# Patient Record
Sex: MALE | Race: WHITE | URBAN - METROPOLITAN AREA
[De-identification: names, ages, dates, MRNs, and addresses within clinical notes are randomized per-mention and may not be internally consistent; named-entity substitution may affect disease eponyms.]

---

## 2021-08-12 ENCOUNTER — HOSPITAL ENCOUNTER (EMERGENCY)
Age: 61
Discharge: HOME | End: 2021-08-12
Payer: COMMERCIAL

## 2022-04-12 ENCOUNTER — HOSPITAL ENCOUNTER (OUTPATIENT)
Dept: GENERAL RADIOLOGY | Age: 62
Discharge: HOME | End: 2022-04-12
Payer: COMMERCIAL

## 2022-04-12 ENCOUNTER — HOSPITAL ENCOUNTER (OUTPATIENT)
Age: 62
Discharge: HOME | End: 2022-04-12
Payer: COMMERCIAL

## 2022-04-12 DIAGNOSIS — Z02.1 DRUG SCREENING, PRE-EMPLOYMENT: ICD-10-CM

## 2022-04-12 PROCEDURE — 71046 X-RAY EXAM CHEST 2 VIEWS: CPT

## 2024-09-21 ENCOUNTER — OFFICE VISIT (OUTPATIENT)
Dept: ORTHOPEDIC SURGERY | Age: 64
End: 2024-09-21

## 2024-09-21 VITALS — WEIGHT: 247 LBS | BODY MASS INDEX: 35.36 KG/M2 | HEIGHT: 70 IN

## 2024-09-21 DIAGNOSIS — M25.561 ACUTE PAIN OF RIGHT KNEE: ICD-10-CM

## 2024-09-21 DIAGNOSIS — M25.561 RIGHT KNEE PAIN, UNSPECIFIED CHRONICITY: Primary | ICD-10-CM

## 2024-09-23 ENCOUNTER — TELEPHONE (OUTPATIENT)
Dept: ORTHOPEDIC SURGERY | Age: 64
End: 2024-09-23

## 2024-09-26 ENCOUNTER — OFFICE VISIT (OUTPATIENT)
Dept: ORTHOPEDIC SURGERY | Age: 64
End: 2024-09-26

## 2024-09-26 VITALS — HEIGHT: 70 IN | BODY MASS INDEX: 35.36 KG/M2 | WEIGHT: 247 LBS

## 2024-09-26 DIAGNOSIS — M23.203 DEGENERATIVE TEAR OF MEDIAL MENISCUS OF RIGHT KNEE: ICD-10-CM

## 2024-09-26 DIAGNOSIS — S86.911A KNEE STRAIN, RIGHT, INITIAL ENCOUNTER: ICD-10-CM

## 2024-09-26 DIAGNOSIS — M25.561 ACUTE PAIN OF RIGHT KNEE: ICD-10-CM

## 2024-09-26 DIAGNOSIS — M17.11 PRIMARY OSTEOARTHRITIS OF RIGHT KNEE: ICD-10-CM

## 2024-09-26 RX ORDER — METHYLPREDNISOLONE 4 MG
TABLET, DOSE PACK ORAL
Qty: 1 KIT | Refills: 0 | Status: SHIPPED | OUTPATIENT
Start: 2024-09-26

## 2024-10-03 ENCOUNTER — HOSPITAL ENCOUNTER (OUTPATIENT)
Dept: PHYSICAL THERAPY | Age: 64
Setting detail: THERAPIES SERIES
Discharge: HOME | End: 2024-10-03
Payer: COMMERCIAL

## 2024-10-03 DIAGNOSIS — R52 PAIN: ICD-10-CM

## 2024-10-03 DIAGNOSIS — R53.1 WEAKNESS: ICD-10-CM

## 2024-10-03 DIAGNOSIS — R26.89 BALANCE DISORDER: ICD-10-CM

## 2024-10-03 DIAGNOSIS — R26.9 GAIT ABNORMALITY: Primary | ICD-10-CM

## 2024-10-03 PROCEDURE — 97530 THERAPEUTIC ACTIVITIES: CPT

## 2024-10-03 PROCEDURE — 97110 THERAPEUTIC EXERCISES: CPT

## 2024-10-03 PROCEDURE — 97161 PT EVAL LOW COMPLEX 20 MIN: CPT

## 2024-10-03 NOTE — PLAN OF CARE
[facility] - Outpatient Rehabilitation and Therapy [address], [place] [address] office: [phone] fax: [phone]     Physical Therapy Initial Evaluation Certification      Dear Tian James*,    We had the pleasure of evaluating the following patient for physical therapy services at [facility].  A summary of our findings can be found in the initial assessment below.  This includes our plan of care.  If you have any questions or concerns regarding these findings, please do not hesitate to contact me at the office phone number listed above.  Thank you for the referral.     Physician Signature:_______________________________Date:__________________  By signing above (or electronic signature), therapist’s plan is approved by physician       Physical Therapy: TREATMENT/PROGRESS NOTE   Patient: Luis Fernando Diaan (64 y.o. male)   Examination Date: 10/03/2024   :  1960 MRN: [id number]   Visit #: 1   Insurance Allowable Auth Needed   60/yr  0 used as of 10/3/24 []Yes    [x]No    Insurance: Payor: [facility] / Plan: BCBS - [place] PPO / Product Type: *No Product type* /   Insurance ID: [id number] - ([place] [employer])  Secondary Insurance (if applicable): OCCUPATIONAL HE[employer]*   Treatment Diagnosis:     ICD-10-CM    1. Gait abnormality  R26.9       2. Pain  R52       3. Weakness  R53.1       4. Balance disorder  R26.89          Medical Diagnosis:  Acute pain of right knee [M25.561]   Referring Physician: Tian James*  PCP: James Cooney MD     Plan of care signed (Y/N):     Date of Patient follow up with Physician: 10/10/24     Plan of Care Report: DIGNA pitts  POC update due: (10 visits /OR AUTH LIMITS, whichever is less)  2024                                             Medical History:  Relevant Medical History: none                                         Precautions/ Contra-indications:           Latex allergy:

## 2024-10-08 ENCOUNTER — HOSPITAL ENCOUNTER (OUTPATIENT)
Dept: PHYSICAL THERAPY | Age: 64
Setting detail: THERAPIES SERIES
Discharge: HOME | End: 2024-10-08
Payer: COMMERCIAL

## 2024-10-08 PROCEDURE — 97530 THERAPEUTIC ACTIVITIES: CPT

## 2024-10-08 PROCEDURE — 97110 THERAPEUTIC EXERCISES: CPT

## 2024-10-08 NOTE — FLOWSHEET NOTE
1/2 foam calf raise  2 10    Leg ext machine 30# 2 10    Leg curl machine 40# 2 10    Leg press       Lateral band walks YTB 20 ft 1                                       Manual Intervention (06563)  TIME                                        NMR re-education (07873) resistance Sets/time Reps CUES NEEDED   Tandem airex 1' ea  With 10# KB UE passes   1/2 foam  1' ea  Both sides   SLS                            Therapeutic Activity (92718)  Sets/time            Squat TRX 2 10    Step up 4\" 2 10    Step down                Modalities:    No modalities applied this session    Education/Home Exercise Program: Patient HEP program created electronically.  Refer to [employer] access code: [email or web address]  Access Code: [email or web address]  URL: [email or web address]  Date: 10/03/2024  Prepared by: Patricia Sanz    Exercises  - Prone Hip Extension  - 1 x daily - 7 x weekly - 3 sets - 10 reps  - Sidelying Hip Abduction  - 1 x daily - 7 x weekly - 3 sets - 10 reps  - Active Straight Leg Raise with Quad Set  - 1 x daily - 7 x weekly - 3 sets - 10 reps  - Sidelying Hip Adduction  - 1 x daily - 7 x weekly - 3 sets - 10 reps  - Wall Squat  - 1 x daily - 7 x weekly - 1 sets - 3-5 reps - 20\" hold  - Standing Terminal Knee Extension at Wall with Ball  - 1 x daily - 7 x weekly - 1 sets - 20 reps - 3\" hold    ASSESSMENT     Today's Assessment:   Exercises introduced per log. No reproduction of sx during the above activities, though patient did have medial knee soreness, discussed this was to be expected and to manage inc in pain. Pt appropriately fatigued at end of session. Will progress exercises as tolerated.       Medical Necessity Documentation:  I certify that this patient meets the below criteria necessary for medical necessity for care and/or justification of therapy services:  The patient has functional impairments and/or activity limitations and would benefit from continued outpatient therapy services to address the deficits outlined in the

## 2024-10-10 ENCOUNTER — OFFICE VISIT (OUTPATIENT)
Dept: ORTHOPEDIC SURGERY | Age: 64
End: 2024-10-10
Payer: COMMERCIAL

## 2024-10-10 VITALS — HEIGHT: 70 IN | WEIGHT: 246.91 LBS | BODY MASS INDEX: 35.35 KG/M2

## 2024-10-10 DIAGNOSIS — M17.11 PRIMARY OSTEOARTHRITIS OF RIGHT KNEE: Primary | ICD-10-CM

## 2024-10-10 DIAGNOSIS — S86.911D KNEE STRAIN, RIGHT, SUBSEQUENT ENCOUNTER: ICD-10-CM

## 2024-10-10 DIAGNOSIS — M23.203 DEGENERATIVE TEAR OF MEDIAL MENISCUS OF RIGHT KNEE: ICD-10-CM

## 2024-10-10 PROCEDURE — 99213 OFFICE O/P EST LOW 20 MIN: CPT | Performed by: INTERNAL MEDICINE

## 2024-10-10 PROCEDURE — L1833 KO ADJ JNT POS R SUP PRE OTS: HCPCS | Performed by: INTERNAL MEDICINE

## 2024-10-15 ENCOUNTER — APPOINTMENT (OUTPATIENT)
Dept: PHYSICAL THERAPY | Age: 64
End: 2024-10-15
Payer: COMMERCIAL

## 2024-10-15 NOTE — PROGRESS NOTES
worsening of the condition.           Other Outside Imaging and Testing Personally Reviewed:    No results found.           Assessment   Impression: .    Encounter Diagnoses   Name Primary?    Primary osteoarthritis of right knee Yes    Degenerative tear of medial meniscus of right knee     Knee strain, right, subsequent encounter               Plan:     PA MALIK right knee as a treatment option to consider pending his improvement over the next 10 to 14 days  continue course of PT and transition to IHEP  Activity modification -knee precautions and caution against overuse  Medical pain management: analgesic: Tylenol, NSAIDs contraindicated relative to CAD  DJ playmaker functional hinged knee brace     Orders:        Orders Placed This Encounter   Procedures    DJO Playmaker Brace     Patient was prescribed a Don Franny Playmaker.  The right knee will require stabilization / immobilization from this semi-rigid / rigid orthosis to improve their function.  The orthosis will assist in protecting the affected area, provide functional support and facilitate healing.    The patient was educated and fit by a healthcare professional with expert knowledge and specialization in brace application while under the direct supervision of the physician.  Verbal and written instructions for the use of and application of this item were provided.   They were instructed to contact the office immediately should the brace result in increased pain, decreased sensation, increased swelling or worsening of the condition.         Tian James MD.      Disclaimer:    \"This note was dictated with voice recognition software. Though review and correction are routine, we apologize for any errors.\"

## 2024-10-17 ENCOUNTER — APPOINTMENT (OUTPATIENT)
Dept: PHYSICAL THERAPY | Age: 64
End: 2024-10-17
Payer: COMMERCIAL

## 2024-10-22 ENCOUNTER — HOSPITAL ENCOUNTER (OUTPATIENT)
Dept: PHYSICAL THERAPY | Age: 64
Setting detail: THERAPIES SERIES
Discharge: HOME | End: 2024-10-22
Payer: COMMERCIAL

## 2024-10-22 PROCEDURE — 97110 THERAPEUTIC EXERCISES: CPT

## 2024-10-22 PROCEDURE — 97530 THERAPEUTIC ACTIVITIES: CPT

## 2024-10-22 NOTE — FLOWSHEET NOTE
[facility] with confirmed medial meniscus tear. Pt denies catching, locking, giving way. Hx of L knee scope 4x, no issues with this currently. Uses R knee sleeve. Step to pattern to go up/down stairs. 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    MRI on 9/25/24:  CONCLUSION:   1. Trizonal undersurface 3 cm flap tear of the medial meniscus, extending from posterior root-horn junction to midbody, with slight meniscal body extrusion.   2. 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   3. Focal grade 4 chondromalacia of the trochlear groove, with penetrating erosion and localized osteoedema of the midbody aspect.   4. Mild tendinopathy of the medial gastrocnemius tendon origin and distal semimembranosus tendon insertion, with adjacent partially dehisced Baker cyst.   5. No lateral meniscus tear. No cruciate ligament or lateral collateral complex injury.        Test used Initial score  10/3/24 10/22/2024   Pain Summary VAS 4/10    Functional questionnaire WOMAC 19/96  20% LOF    Other:              OBJECTIVE EXAMINATION     10/3/24  ROM/Strength: (Blank cells denote NT)   R 0-131  L 0-139      MMT R MMT L Notes       HIP  Flexion 5       Abduction 5       ER        IR        Extension 5     KNEE  Flexion 44.5 33.8      Extension 36.0 43.0      ANKLE  DF        PF        Inversion        Eversion        Palpation:   Patient reported tenderness with palpation  Location:R medial joint line    Posture:   genu valgum Bilateral - mild    Gait:    Pattern: antalgic pattern  Assistive Device Used: Crutch(es) on left     Exercises/Interventions     Therapeutic Ex (47435)  resistance Sets/time Reps

## 2024-10-24 ENCOUNTER — HOSPITAL ENCOUNTER (OUTPATIENT)
Dept: PHYSICAL THERAPY | Age: 64
Setting detail: THERAPIES SERIES
Discharge: HOME | End: 2024-10-24
Payer: COMMERCIAL

## 2024-10-24 PROCEDURE — 97110 THERAPEUTIC EXERCISES: CPT

## 2024-10-24 PROCEDURE — 97530 THERAPEUTIC ACTIVITIES: CPT

## 2024-10-24 NOTE — FLOWSHEET NOTE
[facility] - Outpatient Rehabilitation and Therapy [address], [place] [address] office: [phone] fax: [phone]      Physical Therapy: TREATMENT/PROGRESS NOTE   Patient: Luis Fernando Diana (64 y.o. male)   Examination Date: 10/24/2024   :  1960 MRN: [id number]   Visit #: 4   Insurance Allowable Auth Needed   60/yr  0 used as of 10/3/24 []Yes    [x]No    Insurance: Payor: BCBS / Plan: BCBS - [place] PPO / Product Type: *No Product type* /   Insurance ID: [id number] - ([place] [employer]BS)  Secondary Insurance (if applicable):    Treatment Diagnosis:     ICD-10-CM    1. Gait abnormality  R26.9       2. Pain  R52       3. Weakness  R53.1       4. Balance disorder  R26.89          Medical Diagnosis:  Acute pain of right knee [M25.561]   Referring Physician: Tian James*  PCP: James Cooney MD     Plan of care signed (Y/N):     Date of Patient follow up with Physician: none     Plan of Care Report: NO  POC update due: (10 visits /OR AUTH LIMITS, whichever is less)  2024                                             Medical History:  Relevant Medical History: none                                         Precautions/ Contra-indications:           Latex allergy:  NO  Pacemaker:    NO  Contraindications for Manipulation: None    Red Flags:  None    Suicide Screening:   The patient did not verbalize a primary behavioral concern, suicidal ideation, suicidal intent, or demonstrate suicidal behaviors.    Preferred Language for Healthcare:   [x] English       [] other:    SUBJECTIVE EXAMINATION     Patient stated complaint:  Pt reports he wasn't sore after last session. 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 Went to [facility] in the [facility], had MRI

## 2024-10-29 ENCOUNTER — APPOINTMENT (OUTPATIENT)
Dept: PHYSICAL THERAPY | Age: 64
End: 2024-10-29
Payer: COMMERCIAL

## 2024-10-31 ENCOUNTER — HOSPITAL ENCOUNTER (OUTPATIENT)
Dept: PHYSICAL THERAPY | Age: 64
Setting detail: THERAPIES SERIES
Discharge: HOME | End: 2024-10-31
Payer: COMMERCIAL

## 2024-10-31 PROCEDURE — 97530 THERAPEUTIC ACTIVITIES: CPT

## 2024-10-31 NOTE — FLOWSHEET NOTE
[facility] - Outpatient Rehabilitation and Therapy [address], [place] [address] office: [phone] fax: [phone]      Physical Therapy: TREATMENT/PROGRESS NOTE   Patient: Luis Fernando Diana (64 y.o. male)   Examination Date: 10/31/2024   :  1960 MRN: [id number]   Visit #: 5   Insurance Allowable Auth Needed   60/yr  0 used as of 10/3/24 []Yes    [x]No    Insurance: Payor: BCBS / Plan: BCBS [place] PPO / Product Type: *No Product type* /   Insurance ID: [id number] - ([place] BCBS)  Secondary Insurance (if applicable):    Treatment Diagnosis:     ICD-10-CM    1. Gait abnormality  R26.9       2. Pain  R52       3. Weakness  R53.1       4. Balance disorder  R26.89          Medical Diagnosis:  Acute pain of right knee [M25.561]   Referring Physician: Tian James*  PCP: James Cooney MD     Plan of care signed (Y/N):     Date of Patient follow up with Physician: none     Plan of Care Report: NO  POC update due: (10 visits /OR AUTH LIMITS, whichever is less)  2024                                             Medical History:  Relevant Medical History: none                                         Precautions/ Contra-indications:           Latex allergy:  NO  Pacemaker:    NO  Contraindications for Manipulation: None    Red Flags:  None    Suicide Screening:   The patient did not verbalize a primary behavioral concern, suicidal ideation, suicidal intent, or demonstrate suicidal behaviors.    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 He is getting intermittent sharp R knee pain with ascending stairs. Would like to know what to do about this.    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

## 2024-12-19 DIAGNOSIS — M17.11 PRIMARY OSTEOARTHRITIS OF RIGHT KNEE: Primary | ICD-10-CM

## 2024-12-24 ENCOUNTER — OFFICE VISIT (OUTPATIENT)
Dept: ORTHOPEDIC SURGERY | Age: 64
End: 2024-12-24
Payer: COMMERCIAL

## 2024-12-24 VITALS — BODY MASS INDEX: 35.22 KG/M2 | WEIGHT: 246 LBS | HEIGHT: 70 IN

## 2024-12-24 DIAGNOSIS — M23.203 DEGENERATIVE TEAR OF MEDIAL MENISCUS OF RIGHT KNEE: ICD-10-CM

## 2024-12-24 DIAGNOSIS — M17.11 PRIMARY OSTEOARTHRITIS OF RIGHT KNEE: Primary | ICD-10-CM

## 2024-12-24 PROCEDURE — 99213 OFFICE O/P EST LOW 20 MIN: CPT | Performed by: INTERNAL MEDICINE

## 2024-12-24 RX ORDER — METHYLPREDNISOLONE 4 MG/1
TABLET ORAL
Qty: 1 KIT | Refills: 0 | Status: SHIPPED | OUTPATIENT
Start: 2024-12-24

## 2024-12-24 NOTE — PROGRESS NOTES
Chief Complaint:   Chief Complaint   Patient presents with    Knee Pain     Right knee - Pain is not getting better. No relief with PT. Burning pain when kneeling down. Impossible to go up steps.          History of Present Illness:       Patient is a 64 y.o. male returns follow up for the above complaint. The patient was last seen approximately 2 monthsago and lost from follow-up. The symptoms overall improved but remain problematic since the last visit. The patient has had no further testing for the problem.      Knee remains problematic especially with ascending stairs    Pain localizes lateral and medial j    No pattern of activity related swelling of significance.    He would like to proceed with HA therapy that was previously discussed    Pain level 3/10       Past Medical History:        Past Medical History:   Diagnosis Date    Environmental allergies         Present Medications:         Current Outpatient Medications   Medication Sig Dispense Refill    methylPREDNISolone (MEDROL, BROWN,) 4 MG tablet By mouth. 1 kit 0    cetirizine (ZYRTEC) 10 MG tablet Take 10 mg by mouth daily as needed.      Probiotic Product (PROBIOTIC DAILY PO) Take  by mouth daily.      UNABLE TO FIND daily. L GLUTAMINE      Multiple Vitamins-Minerals (THERAPEUTIC MULTIVITAMIN-MINERALS) tablet Take 1 tablet by mouth daily.      UNABLE TO FIND 2 times daily. FIGURE WEIGHT LOSS PHENTERMINE HCL 37.5MG      naproxen (NAPROSYN) 500 MG tablet Take 1 tablet by mouth 2 times daily for 20 doses. 20 tablet 0     No current facility-administered medications for this visit.         Allergies:        Allergies   Allergen Reactions    Duloxetine     Hydrocodone-Acetaminophen Itching     Felt like things were crawling on him    Morphine And Codeine Anxiety and Shortness Of Breath     Panic Attack    Other Reaction(s): DifficultyBreathing      Panic Attack    Duloxetine Hcl Nausea Only     Upset and nausea    Gabapentin Other (See Comments)     Panic

## 2025-01-20 ENCOUNTER — TELEPHONE (OUTPATIENT)
Dept: ORTHOPEDIC SURGERY | Age: 65
End: 2025-01-20

## 2025-01-20 NOTE — TELEPHONE ENCOUNTER
General Question     Subject: NO RELIEF AFTER 2ND ROUND OF STEROIDS   Patient and /or Facility Request: Luis Fernando Diana   Contact Number: [phone]      PLEASE ADVISE IF Pt NEEDS ANOTHER APPOINTMENT OR WHAT DR ARVIZU WANTS TO DO GOING FORWARD?

## 2025-01-29 ENCOUNTER — OFFICE VISIT (OUTPATIENT)
Dept: ORTHOPEDIC SURGERY | Age: 65
End: 2025-01-29

## 2025-01-29 VITALS — HEIGHT: 70 IN | BODY MASS INDEX: 35.22 KG/M2 | WEIGHT: 246 LBS

## 2025-01-29 DIAGNOSIS — M17.11 PRIMARY OSTEOARTHRITIS OF RIGHT KNEE: Primary | ICD-10-CM

## 2025-01-29 DIAGNOSIS — M23.203 DEGENERATIVE TEAR OF MEDIAL MENISCUS OF RIGHT KNEE: ICD-10-CM

## 2025-01-29 NOTE — PROGRESS NOTES
Chief Complaint:   Chief Complaint   Patient presents with    Knee Pain     Right Knee - Durolane injection today. Medrol with 0% relief.          History of Present Illness:       Patient is a 64 y.o. male returns follow up for the above complaint. The patient was last seen approximately 1 monthsago. The symptoms are improving since the last visit. The patient has had no further testing for the problem.      Symptoms remain problematic.  He would like to proceed with HA therapy as previously discussed    Pain levels 5/10    Pain localized to the anterior aspect of the knee particularly worsened with navigating stairs     Past Medical History:        Past Medical History:   Diagnosis Date    Environmental allergies         Present Medications:         Current Outpatient Medications   Medication Sig Dispense Refill    methylPREDNISolone (MEDROL, BROWN,) 4 MG tablet By mouth. 1 kit 0    cetirizine (ZYRTEC) 10 MG tablet Take 10 mg by mouth daily as needed.      Probiotic Product (PROBIOTIC DAILY PO) Take  by mouth daily.      UNABLE TO FIND daily. L GLUTAMINE      Multiple Vitamins-Minerals (THERAPEUTIC MULTIVITAMIN-MINERALS) tablet Take 1 tablet by mouth daily.      UNABLE TO FIND 2 times daily. FIGURE WEIGHT LOSS PHENTERMINE HCL 37.5MG      naproxen (NAPROSYN) 500 MG tablet Take 1 tablet by mouth 2 times daily for 20 doses. 20 tablet 0     No current facility-administered medications for this visit.         Allergies:        Allergies   Allergen Reactions    Duloxetine     Hydrocodone-Acetaminophen Itching     Felt like things were crawling on him    Morphine And Codeine Anxiety and Shortness Of Breath     Panic Attack    Other Reaction(s): DifficultyBreathing      Panic Attack    Duloxetine Hcl Nausea Only     Upset and nausea    Gabapentin Other (See Comments)     Panic attacks    Vicodin [Hydrocodone-Acetaminophen] Itching    Fluoxetine Other (See Comments)     \"made me feel weird\"           Review of

## 2025-01-31 ENCOUNTER — TELEPHONE (OUTPATIENT)
Dept: ORTHOPEDIC SURGERY | Age: 65
End: 2025-01-31

## 2025-01-31 NOTE — TELEPHONE ENCOUNTER
Other PATIENT WANTS TO CONFIRM WHEN DR ARVIZU WANTED HIM TO FOLLOW UP SO THAT HE CAN SCHEDULE THE APPOINTMENT. PLEASE CALL TO ADVISE. [phone] OK TO LEAVE A MESSAGE. PATIENT DRIVES [employer]

## 2025-04-15 ENCOUNTER — OFFICE VISIT (OUTPATIENT)
Dept: ORTHOPEDIC SURGERY | Age: 65
End: 2025-04-15

## 2025-04-15 VITALS — WEIGHT: 246 LBS | HEIGHT: 70 IN | BODY MASS INDEX: 35.22 KG/M2

## 2025-04-15 DIAGNOSIS — M23.203 DEGENERATIVE TEAR OF MEDIAL MENISCUS OF RIGHT KNEE: ICD-10-CM

## 2025-04-15 DIAGNOSIS — M17.11 PRIMARY OSTEOARTHRITIS OF RIGHT KNEE: Primary | ICD-10-CM

## 2025-04-15 DIAGNOSIS — M21.161 ACQUIRED VARUS DEFORMITY KNEE, RIGHT: ICD-10-CM

## 2025-04-15 NOTE — PROGRESS NOTES
Chief Complaint:   Chief Complaint   Patient presents with    Knee Pain     Right Knee - He doesn't have any pain except when he goes up stairs. Otherwise it feels normal.          History of Present Illness:       Patient is a 65 y.o. male returns follow up for the above complaint. The patient was last seen approximately 3 monthsago. The symptoms remain problematic since the last visit. The patient has had no further testing for the problem.      Knee remains problematic pain localized to the anterior compartment predictably aggravated by ascending stairs    Status post USG HA 1/29/2025 with definite partial benefit    He continues with intermittent use of functional knee bracing    Pain levels 4/10 with provocative activity        Activity swelling or new onset mechanical symptoms         Past Medical History:        Past Medical History:   Diagnosis Date    Environmental allergies         Present Medications:         Current Outpatient Medications   Medication Sig Dispense Refill    cetirizine (ZYRTEC) 10 MG tablet Take 10 mg by mouth daily as needed.      Probiotic Product (PROBIOTIC DAILY PO) Take  by mouth daily.      UNABLE TO FIND daily. L GLUTAMINE      Multiple Vitamins-Minerals (THERAPEUTIC MULTIVITAMIN-MINERALS) tablet Take 1 tablet by mouth daily.      UNABLE TO FIND 2 times daily. FIGURE WEIGHT LOSS PHENTERMINE HCL 37.5MG      naproxen (NAPROSYN) 500 MG tablet Take 1 tablet by mouth 2 times daily for 20 doses. 20 tablet 0     No current facility-administered medications for this visit.         Allergies:        Allergies   Allergen Reactions    Duloxetine     Hydrocodone-Acetaminophen Itching     Felt like things were crawling on him    Morphine And Codeine Anxiety and Shortness Of Breath     Panic Attack    Other Reaction(s): DifficultyBreathing      Panic Attack    Duloxetine Hcl Nausea Only     Upset and nausea    Gabapentin Other (See Comments)     Panic attacks    Vicodin

## 2025-04-16 ENCOUNTER — TELEPHONE (OUTPATIENT)
Dept: ORTHOPEDIC SURGERY | Age: 65
End: 2025-04-16

## 2025-04-16 NOTE — TELEPHONE ENCOUNTER
Spoke with patient in regards to pre-cert information for the unloading knee brace that was recommended to him by Dr. James. Patient stated that he would like to hold off on proceeding with the brace at this time due to out of pocket cost for patient. Patient can give us a callback in the future if he would like us to resubmit again at a later date. [phone].